# Patient Record
Sex: FEMALE | Race: BLACK OR AFRICAN AMERICAN | Employment: FULL TIME | URBAN - METROPOLITAN AREA
[De-identification: names, ages, dates, MRNs, and addresses within clinical notes are randomized per-mention and may not be internally consistent; named-entity substitution may affect disease eponyms.]

---

## 2021-11-11 ENCOUNTER — HOSPITAL ENCOUNTER (EMERGENCY)
Facility: HOSPITAL | Age: 46
Discharge: HOME | End: 2021-11-11
Payer: COMMERCIAL

## 2021-11-11 VITALS
SYSTOLIC BLOOD PRESSURE: 145 MMHG | OXYGEN SATURATION: 98 % | HEART RATE: 57 BPM | TEMPERATURE: 98 F | DIASTOLIC BLOOD PRESSURE: 77 MMHG | RESPIRATION RATE: 16 BRPM

## 2021-11-11 DIAGNOSIS — Z20.822 CLOSE EXPOSURE TO COVID-19 VIRUS: Primary | ICD-10-CM

## 2021-11-11 PROCEDURE — 99283 EMERGENCY DEPT VISIT LOW MDM: CPT

## 2021-11-11 RX ORDER — ACETAMINOPHEN 500 MG
1000 TABLET ORAL ONCE
Status: COMPLETED | OUTPATIENT
Start: 2021-11-11 | End: 2021-11-11

## 2021-11-11 NOTE — ED INITIAL ASSESSMENT (HPI)
Patient aox3 to ed via private vehicle requesting [id number] testing +headache x Tuesday +cough x today +exposure

## 2021-11-11 NOTE — ED PROVIDER NOTES
Patient Seen in: [facility] AND [facility] Emergency Department      History   Patient presents with:  Testing  Headache  Cough/URI    Stated Complaint: testing; headaches    Subjective:   HPI    28-year-old female presents the emergency department for evaluatio and atraumatic. Right Ear: External ear normal.      Left Ear: External ear normal.      Mouth/Throat:      Mouth: Mucous membranes are moist.      Pharynx: Oropharynx is clear. Eyes:      Extraocular Movements: Extraocular movements intact.       Co

## 2023-11-01 ENCOUNTER — HOSPITAL ENCOUNTER (INPATIENT)
Facility: HOSPITAL | Age: 48
LOS: 2 days | Discharge: HOME | End: 2023-11-03
Attending: EMERGENCY MEDICINE | Admitting: INTERNAL MEDICINE
Payer: COMMERCIAL

## 2023-11-01 ENCOUNTER — APPOINTMENT (OUTPATIENT)
Dept: MRI IMAGING | Facility: HOSPITAL | Age: 48
End: 2023-11-01
Attending: EMERGENCY MEDICINE
Payer: COMMERCIAL

## 2023-11-01 ENCOUNTER — APPOINTMENT (OUTPATIENT)
Dept: CT IMAGING | Facility: HOSPITAL | Age: 48
End: 2023-11-01
Payer: COMMERCIAL

## 2023-11-01 DIAGNOSIS — R20.2 NUMBNESS AND TINGLING OF RIGHT ARM: Primary | ICD-10-CM

## 2023-11-01 DIAGNOSIS — R20.0 NUMBNESS AND TINGLING OF RIGHT ARM: Primary | ICD-10-CM

## 2023-11-01 DIAGNOSIS — I10 HYPERTENSION, UNSPECIFIED TYPE: ICD-10-CM

## 2023-11-01 LAB
ANION GAP SERPL CALC-SCNC: 6 MMOL/L (ref 0–18)
ANTIBODY SCREEN: NEGATIVE
BASOPHILS # BLD AUTO: 0.02 X10(3) UL (ref 0–0.2)
BASOPHILS NFR BLD AUTO: 0.3 %
BUN BLD-MCNC: 8 MG/DL (ref 9–23)
BUN/CREAT SERPL: 10.3 (ref 10–20)
CALCIUM BLD-MCNC: 9.6 MG/DL (ref 8.7–10.4)
CHLORIDE SERPL-SCNC: 100 MMOL/L (ref 98–112)
CO2 SERPL-SCNC: 29 MMOL/L (ref 21–32)
CREAT BLD-MCNC: 0.78 MG/DL
DEPRECATED RDW RBC AUTO: 41.4 FL (ref 35.1–46.3)
EGFRCR SERPLBLD CKD-EPI 2021: 95 ML/MIN/1.73M2 (ref 60–?)
EOSINOPHIL # BLD AUTO: 0.11 X10(3) UL (ref 0–0.7)
EOSINOPHIL NFR BLD AUTO: 1.7 %
ERYTHROCYTE [DISTWIDTH] IN BLOOD BY AUTOMATED COUNT: 14.1 % (ref 11–15)
GLUCOSE BLD-MCNC: 104 MG/DL (ref 70–99)
GLUCOSE BLDC GLUCOMTR-MCNC: 103 MG/DL (ref 70–99)
GLUCOSE BLDC GLUCOMTR-MCNC: 121 MG/DL (ref 70–99)
HCT VFR BLD AUTO: 38.4 %
HGB BLD-MCNC: 12.6 G/DL
IMM GRANULOCYTES # BLD AUTO: 0.02 X10(3) UL (ref 0–1)
IMM GRANULOCYTES NFR BLD: 0.3 %
LYMPHOCYTES # BLD AUTO: 2.46 X10(3) UL (ref 1–4)
LYMPHOCYTES NFR BLD AUTO: 38.4 %
MCH RBC QN AUTO: 26.8 PG (ref 26–34)
MCHC RBC AUTO-ENTMCNC: 32.8 G/DL (ref 31–37)
MCV RBC AUTO: 81.5 FL
MONOCYTES # BLD AUTO: 0.45 X10(3) UL (ref 0.1–1)
MONOCYTES NFR BLD AUTO: 7 %
NEUTROPHILS # BLD AUTO: 3.34 X10 (3) UL (ref 1.5–7.7)
NEUTROPHILS # BLD AUTO: 3.34 X10(3) UL (ref 1.5–7.7)
NEUTROPHILS NFR BLD AUTO: 52.3 %
OSMOLALITY SERPL CALC.SUM OF ELEC: 279 MOSM/KG (ref 275–295)
PLATELET # BLD AUTO: 272 10(3)UL (ref 150–450)
POTASSIUM SERPL-SCNC: 3.7 MMOL/L (ref 3.5–5.1)
RBC # BLD AUTO: 4.71 X10(6)UL
RH BLOOD TYPE: POSITIVE
RH BLOOD TYPE: POSITIVE
SODIUM SERPL-SCNC: 135 MMOL/L (ref 136–145)
TROPONIN I SERPL HS-MCNC: 5 NG/L
WBC # BLD AUTO: 6.4 X10(3) UL (ref 4–11)

## 2023-11-01 PROCEDURE — 70450 CT HEAD/BRAIN W/O DYE: CPT | Performed by: EMERGENCY MEDICINE

## 2023-11-01 PROCEDURE — 70551 MRI BRAIN STEM W/O DYE: CPT | Performed by: EMERGENCY MEDICINE

## 2023-11-01 RX ORDER — ACETAMINOPHEN 650 MG/1
650 SUPPOSITORY RECTAL EVERY 4 HOURS PRN
Status: DISCONTINUED | OUTPATIENT
Start: 2023-11-01 | End: 2023-11-03

## 2023-11-01 RX ORDER — METFORMIN HYDROCHLORIDE EXTENDED-RELEASE TABLETS 500 MG/1
1000 TABLET, FILM COATED, EXTENDED RELEASE ORAL
COMMUNITY

## 2023-11-01 RX ORDER — ONDANSETRON 2 MG/ML
4 INJECTION INTRAMUSCULAR; INTRAVENOUS EVERY 6 HOURS PRN
Status: DISCONTINUED | OUTPATIENT
Start: 2023-11-01 | End: 2023-11-03

## 2023-11-01 RX ORDER — LABETALOL HYDROCHLORIDE 5 MG/ML
10 INJECTION, SOLUTION INTRAVENOUS ONCE
Status: COMPLETED | OUTPATIENT
Start: 2023-11-01 | End: 2023-11-01

## 2023-11-01 RX ORDER — HEPARIN SODIUM 5000 [USP'U]/ML
5000 INJECTION, SOLUTION INTRAVENOUS; SUBCUTANEOUS EVERY 12 HOURS SCHEDULED
Status: DISCONTINUED | OUTPATIENT
Start: 2023-11-02 | End: 2023-11-03

## 2023-11-01 RX ORDER — ACETAMINOPHEN 325 MG/1
650 TABLET ORAL EVERY 4 HOURS PRN
Status: DISCONTINUED | OUTPATIENT
Start: 2023-11-01 | End: 2023-11-03

## 2023-11-01 RX ORDER — HYDRALAZINE HYDROCHLORIDE 20 MG/ML
10 INJECTION INTRAMUSCULAR; INTRAVENOUS EVERY 2 HOUR PRN
Status: DISCONTINUED | OUTPATIENT
Start: 2023-11-01 | End: 2023-11-03

## 2023-11-01 RX ORDER — SODIUM CHLORIDE 9 MG/ML
INJECTION, SOLUTION INTRAVENOUS CONTINUOUS
Status: DISCONTINUED | OUTPATIENT
Start: 2023-11-01 | End: 2023-11-02

## 2023-11-01 RX ORDER — ATORVASTATIN CALCIUM 40 MG/1
40 TABLET, FILM COATED ORAL NIGHTLY
Status: DISCONTINUED | OUTPATIENT
Start: 2023-11-01 | End: 2023-11-03

## 2023-11-01 RX ORDER — ASPIRIN 81 MG/1
324 TABLET, CHEWABLE ORAL ONCE
Status: COMPLETED | OUTPATIENT
Start: 2023-11-01 | End: 2023-11-01

## 2023-11-01 RX ORDER — ASPIRIN 81 MG/1
81 TABLET, CHEWABLE ORAL DAILY
Status: DISCONTINUED | OUTPATIENT
Start: 2023-11-02 | End: 2023-11-03

## 2023-11-01 RX ORDER — LABETALOL HYDROCHLORIDE 5 MG/ML
10 INJECTION, SOLUTION INTRAVENOUS EVERY 10 MIN PRN
Status: DISCONTINUED | OUTPATIENT
Start: 2023-11-01 | End: 2023-11-03

## 2023-11-01 RX ORDER — PROCHLORPERAZINE EDISYLATE 5 MG/ML
5 INJECTION INTRAMUSCULAR; INTRAVENOUS EVERY 8 HOURS PRN
Status: DISCONTINUED | OUTPATIENT
Start: 2023-11-01 | End: 2023-11-03

## 2023-11-02 ENCOUNTER — APPOINTMENT (OUTPATIENT)
Dept: CT IMAGING | Facility: HOSPITAL | Age: 48
End: 2023-11-02
Attending: Other
Payer: COMMERCIAL

## 2023-11-02 LAB
ATRIAL RATE: 79 BPM
CHOLEST SERPL-MCNC: 189 MG/DL (ref ?–200)
EST. AVERAGE GLUCOSE BLD GHB EST-MCNC: 163 MG/DL (ref 68–126)
GLUCOSE BLDC GLUCOMTR-MCNC: 132 MG/DL (ref 70–99)
GLUCOSE BLDC GLUCOMTR-MCNC: 145 MG/DL (ref 70–99)
GLUCOSE BLDC GLUCOMTR-MCNC: 150 MG/DL (ref 70–99)
GLUCOSE BLDC GLUCOMTR-MCNC: 183 MG/DL (ref 70–99)
HBA1C MFR BLD: 7.3 % (ref ?–5.7)
HDLC SERPL-MCNC: 44 MG/DL (ref 40–59)
LDLC SERPL CALC-MCNC: 121 MG/DL (ref ?–100)
NONHDLC SERPL-MCNC: 145 MG/DL (ref ?–130)
P AXIS: 55 DEGREES
P-R INTERVAL: 188 MS
Q-T INTERVAL: 364 MS
QRS DURATION: 76 MS
QTC CALCULATION (BEZET): 417 MS
R AXIS: 22 DEGREES
T AXIS: 24 DEGREES
TRIGL SERPL-MCNC: 136 MG/DL (ref 30–149)
VENTRICULAR RATE: 79 BPM
VLDLC SERPL CALC-MCNC: 24 MG/DL (ref 0–30)

## 2023-11-02 PROCEDURE — 99223 1ST HOSP IP/OBS HIGH 75: CPT | Performed by: OTHER

## 2023-11-02 PROCEDURE — 70496 CT ANGIOGRAPHY HEAD: CPT | Performed by: OTHER

## 2023-11-02 PROCEDURE — 70498 CT ANGIOGRAPHY NECK: CPT | Performed by: OTHER

## 2023-11-02 RX ORDER — POTASSIUM CHLORIDE 20 MEQ/1
40 TABLET, EXTENDED RELEASE ORAL ONCE
Qty: 2 TABLET | Refills: 0 | Status: COMPLETED | OUTPATIENT
Start: 2023-11-02 | End: 2023-11-02

## 2023-11-02 RX ORDER — IOHEXOL 350 MG/ML
50 INJECTION, SOLUTION INTRAVENOUS
Status: COMPLETED | OUTPATIENT
Start: 2023-11-02 | End: 2023-11-02

## 2023-11-02 RX ORDER — LOSARTAN POTASSIUM 25 MG/1
25 TABLET ORAL DAILY
Status: DISCONTINUED | OUTPATIENT
Start: 2023-11-02 | End: 2023-11-03

## 2023-11-02 NOTE — CM/SW NOTE
11/02/23 0800   CM/SW Referral Data   Referral Source Physician   Reason for Referral Discharge planning   Informant Patient   Medical Hx   Does patient have an established PCP? Yes  Jorge Jones)   Patient Info   Patient's Current Mental Status at Time of Assessment Alert;Oriented   Patient's [address]   Patient lives with Spouse/Significant other   Patient Status Prior to Admission   Independent with ADLs and Mobility Yes   Discharge Needs   Anticipated D/C needs To be determined     Pt discussed during nursing rounds. Dx r/o CVA. Home w/spouse, independent and active w/o device at baseline. MDO received for HH evals. PT/OT evals needed for dc recommendation, RN is aware. Plan: TBD    / to remain available for support and/or discharge planning.      AURORA Martin    [phone]

## 2023-11-02 NOTE — PLAN OF CARE
Patient is A&Ox4, room air, SBA. No c/o of RUE numbness and tingling. Plan for q4 neuros, NIH daily and awaiting for an echo. Call light within reach and fall precautions in place. Problem: Patient Centered Care  Goal: Patient preferences are identified and integrated in the patient's plan of care  Description: Interventions:  - What would you like us to know as we care for you?  Ynes Yadav lives at home with her significant other  - Provide timely, complete, and accurate information to patient/family  - Incorporate patient and family knowledge, values, beliefs, and cultural backgrounds into the planning and delivery of care  - Encourage patient/family to participate in care and decision-making at the level they choose  - Honor patient and family perspectives and choices  Outcome: Progressing     Problem: Diabetes/Glucose Control  Goal: Glucose maintained within prescribed range  Description: INTERVENTIONS:  - Monitor Blood Glucose as ordered  - Assess for signs and symptoms of hyperglycemia and hypoglycemia  - Administer ordered medications to maintain glucose within target range  - Assess barriers to adequate nutritional intake and initiate nutrition consult as needed  - Instruct patient on self management of diabetes  Outcome: Progressing     Problem: Patient/Family Goals  Goal: Patient/Family Long Term Goal  Description: Patient's Long Term Goal: \"to maintain my health so I don't have to be hospitalized\"    Interventions:  - Take medications as prescribed at discharge  - Follow up with PCP and neurology  - Maintain a cardiac and heart healthy diet   - See additional Care Plan goals for specific interventions  Outcome: Progressing  Goal: Patient/Family Short Term Goal  Description: Patient's Short Term Goal: \"to determine why I have this numbness and tingling to my R arm\"    Interventions:   - Neurology consult  - MRI Brain  - CT Brain  - VS and neuro assessments per stroke protocol   - PT/OT/SLP  - CTA brain/carotids  - 2D echo  - See additional Care Plan goals for specific interventions  Outcome: Progressing     Problem: CARDIOVASCULAR - ADULT  Goal: Maintains optimal cardiac output and hemodynamic stability  Description: INTERVENTIONS:  - Monitor vital signs, rhythm, and trends  - Monitor for bleeding, hypotension and signs of decreased cardiac output  - Evaluate effectiveness of vasoactive medications to optimize hemodynamic stability  - Monitor arterial and/or venous puncture sites for bleeding and/or hematoma  - Assess quality of pulses, skin color and temperature  - Assess for signs of decreased coronary artery perfusion - ex. Angina  - Evaluate fluid balance, assess for edema, trend weights  Outcome: Progressing  Goal: Absence of cardiac arrhythmias or at baseline  Description: INTERVENTIONS:  - Continuous cardiac monitoring, monitor vital signs, obtain 12 lead EKG if indicated  - Evaluate effectiveness of antiarrhythmic and heart rate control medications as ordered  - Initiate emergency measures for life threatening arrhythmias  - Monitor electrolytes and administer replacement therapy as ordered  Outcome: Progressing     Problem: NEUROLOGICAL - ADULT  Goal: Achieves stable or improved neurological status  Description: INTERVENTIONS  - Assess for and report changes in neurological status  - Initiate measures to prevent increased intracranial pressure  - Maintain blood pressure and fluid volume within ordered parameters to optimize cerebral perfusion and minimize risk of hemorrhage  - Monitor temperature, glucose, and sodium.  Initiate appropriate interventions as ordered  Outcome: Progressing

## 2023-11-02 NOTE — PLAN OF CARE
Patient reports RUE paresthesia improving. Headache resolved after Tylenol administration. VS/Neuro q4hrs. Ambulatory SBA. IVF infusing. Plan for PT/OT/SLP, 2D echo, and CTA brain/carotids in AM. Discharge home once medically cleared. Problem: Patient Centered Care  Goal: Patient preferences are identified and integrated in the patient's plan of care  Description: Interventions:  - What would you like us to know as we care for you?  Celestina Canchola lives at home with her significant other  - Provide timely, complete, and accurate information to patient/family  - Incorporate patient and family knowledge, values, beliefs, and cultural backgrounds into the planning and delivery of care  - Encourage patient/family to participate in care and decision-making at the level they choose  - Honor patient and family perspectives and choices  Outcome: Progressing     Problem: Diabetes/Glucose Control  Goal: Glucose maintained within prescribed range  Description: INTERVENTIONS:  - Monitor Blood Glucose as ordered  - Assess for signs and symptoms of hyperglycemia and hypoglycemia  - Administer ordered medications to maintain glucose within target range  - Assess barriers to adequate nutritional intake and initiate nutrition consult as needed  - Instruct patient on self management of diabetes  Outcome: Progressing     Problem: Patient/Family Goals  Goal: Patient/Family Long Term Goal  Description: Patient's Long Term Goal: \"to maintain my health so I don't have to be hospitalized\"    Interventions:  - Take medications as prescribed at discharge  - Follow up with PCP and neurology  - Maintain a cardiac and heart healthy diet   - See additional Care Plan goals for specific interventions  Outcome: Progressing  Goal: Patient/Family Short Term Goal  Description: Patient's Short Term Goal: \"to determine why I have this numbness and tingling to my R arm\"    Interventions:   - Neurology consult  - MRI Brain  - CT Brain  - VS and neuro assessments per stroke protocol   - PT/OT/SLP  - CTA brain/carotids  - 2D echo  - See additional Care Plan goals for specific interventions  Outcome: Progressing     Problem: CARDIOVASCULAR - ADULT  Goal: Maintains optimal cardiac output and hemodynamic stability  Description: INTERVENTIONS:  - Monitor vital signs, rhythm, and trends  - Monitor for bleeding, hypotension and signs of decreased cardiac output  - Evaluate effectiveness of vasoactive medications to optimize hemodynamic stability  - Monitor arterial and/or venous puncture sites for bleeding and/or hematoma  - Assess quality of pulses, skin color and temperature  - Assess for signs of decreased coronary artery perfusion - ex. Angina  - Evaluate fluid balance, assess for edema, trend weights  Outcome: Progressing  Goal: Absence of cardiac arrhythmias or at baseline  Description: INTERVENTIONS:  - Continuous cardiac monitoring, monitor vital signs, obtain 12 lead EKG if indicated  - Evaluate effectiveness of antiarrhythmic and heart rate control medications as ordered  - Initiate emergency measures for life threatening arrhythmias  - Monitor electrolytes and administer replacement therapy as ordered  Outcome: Progressing     Problem: NEUROLOGICAL - ADULT  Goal: Achieves stable or improved neurological status  Description: INTERVENTIONS  - Assess for and report changes in neurological status  - Initiate measures to prevent increased intracranial pressure  - Maintain blood pressure and fluid volume within ordered parameters to optimize cerebral perfusion and minimize risk of hemorrhage  - Monitor temperature, glucose, and sodium.  Initiate appropriate interventions as ordered  Outcome: Progressing

## 2023-11-02 NOTE — ED QUICK NOTES
Orders for admission, patient is aware of plan and ready to go upstairs. Any questions, please call ED RN Machelle Parr at extension [phone].      Patient Covid vaccination status: Fully vaccinated     COVID Test Ordered in ED: None    COVID Suspicion at Admission: N/A    Running Infusions:  None    Mental Status/LOC at time of transport: ao4    Other pertinent information:   CIWA score: N/A   NIH score:  1

## 2023-11-02 NOTE — SLP NOTE
ADULT SWALLOWING EVALUATION    ASSESSMENT    ASSESSMENT/OVERALL IMPRESSION:    SLP BSSE orders received and acknowledged. A swallow evaluation warranted per stroke protocol. The pt was admitted with diagnosis of numbness and tingling in right arm. Chart reviewed and collaborated with RN, Shahram Webb. Swallowing evaluation approved and RN reports pt is tolerating current diet and medications without difficulty. Per MD noted, \"Patient  51-year-old female with no significant past medical history here with complaints of acute onset right-sided arm tingling 2 hours prior to arrival.  Patient states that she was driving when she felt the symptoms. Then she started panicking and feels like the symptoms got worse. No leg involvement. No facial asymmetry or dysarthria. No associated weakness. No known trauma. \"  Pt seen at bedside and agreeable to participate in BSSE. Pt denies any pain. Pt is alert O x 4, afebrile with clear vocal quality, tolerating room air with oxygen saturation at 98% prior to the start of po trials. Family was present at the time of testing. Pt with no hx of dysphagia at [address] [place] [address]. Pt with functional motor speech, auditory comprehension, and verbal expression to speech screening. Pt positioned upright to 90 degrees in bed Oral [facility] examination completed. Face symmetrical at rest with functional ROM/strength with labial, lingual. and buccal movements. Assessed pt with po trials of puree, soft/hard solids, and thin liquids via open cup/straw. Pt with functional oral acceptance and bilabial seal across all trials. Oral phase of swallow appears timely with adequate bolus control and propulsion. Pt with intact bite, mastication of solids, and timely A-P transit with solid bolus. No oral stasis was present post the swallow. Pharyngeal phase of the swallow appears timely with adequate hyolaryngeal elevation/excursion. No CXR completed at this time.  Oxygen status remained >98% throughout the entire evaluation. No overt clinical signs of aspiration observed with any consistency (no coughing, no throat clearing, and vocal quality remains dry). Pt denies any globus sensation post the swallow. The pt was left at bedside resting in bed with call light in reach. At this time, pt presents with a functional oropharyngeal swallow. Recommend a regular solid diet and thin liquids/may use straw with strict adherence to safe swallowing compensatory strategies. Pt reports preferred learning style of verbal education. Results and recommendations reviewed with pt, family, and RN. Provided education via verbal discussion. The pt and family acknowledged understanding of the education and was able to verbalize/demonstrate precautions independently. SLP collaborated with RN for diet orders. Diet recommendations and swallowing precautions/strategies posted on white board at bedside. FCM SCORE: 7/7       PLAN:  Speech therapy to sign off case secondary to pt tolerating least restrictive diet and demonstrating use of swallowing precautions. Pt reports speech and swallow are at baseline. Collaborated plan of care with RN. RN and pt in agreement with plan of care. RECOMMENDATIONS   Diet Recommendations - Solids: Regular  Diet Recommendations - Liquids:  Thin Liquids                        Compensatory Strategies Recommended: Small bites and sips  Aspiration Precautions: Upright position;Small bites and sips  Medication Administration Recommendations: No restrictions  Treatment Plan/Recommendations: No further inpatient SLP service warranted  Discharge Recommendations/Plan: Home    HISTORY   MEDICAL HISTORY  Reason for Referral: Stroke protocol    Problem List  Principal Problem:    Numbness and tingling of right arm  Active Problems:    Hypertension, unspecified type      Past Medical History  Past Medical History:   Diagnosis Date    Diabetes ([facility] [address]        Prior Living Situation: Home with support  Diet Prior to Admission: Regular; Thin liquids  Precautions: Aspiration    Patient/Family Goals: To eat    SWALLOWING HISTORY  Current Diet Consistency: Regular; Thin liquids  Dysphagia History: No history of dysphagia  Imaging Results:   CT stroke:  CONCLUSION:   1. No acute infarct or hemorrhage. 2. A widened perivascular space versus a chronic left subinsular infarct. This report was called immediately to Dr. Claudia Pedroza            Dictated by (CST): Abdias Brizuela MD on 11/01/2023 at 7:08 PM       Finalized by (CST): Abdias Brizuela MD on 11/01/2023 at 7:12 PM       MRI:  CONCLUSION:   1. No acute infarct or hemorrhage. 2. Few nonspecific foci of white matter signal abnormality probably represent changes of mild chronic small vessel disease. Other causes of white matter signal abnormality including demyelinating disease are felt to be less likely. Dictated by (CST): Abdias Brizuela MD on 11/01/2023 at 8:30 PM       Finalized by (CST): Abdias Brizuela MD on 11/01/2023 at 8:34 PM       SUBJECTIVE   Pt alert and O x 4. OBJECTIVE   ORAL MOTOR EXAMINATION  Dentition: Natural;Functional  Symmetry: Within Functional Limits  Strength: Within Functional Limits  Tone: Within Functional Limits  Range of Motion: Within Functional Limits  Rate of Motion: Within Functional Limits    Voice Quality: Clear  Respiratory Status: Unlabored  Consistencies Trialed: Thin liquids;Puree;Hard solid  Method of Presentation: Self presentation;Spoon;Cup;Straw;Single sips; Consecutive swallows  Patient Positioning: Upright;Midline    Oral Phase of Swallow: Within Functional Limits                      Pharyngeal Phase of Swallow: Within Functional Limits           (Please note: Silent aspiration cannot be evaluated clinically.  Videofluoroscopic Swallow Study is required to rule-out silent aspiration.)    Esophageal Phase of Swallow: No complaints consistent with possible esophageal involvement                FOLLOW UP  Treatment Plan/Recommendations: No further inpatient SLP service warranted     Follow Up Needed (Documentation Required): No       Thank you for your referral.   If you have any questions, please contact     Abigail Bueno MS/[facility]-SLP  Speech Language Pathologist  [address] [facility]  [address].  [address]

## 2023-11-02 NOTE — PHYSICAL THERAPY NOTE
PHYSICAL THERAPY EVALUATION - INPATIENT     Room Number: 807/055-Q  Evaluation Date: 11/2/2023  Type of Evaluation: Initial   Physician Order: PT Eval and Treat    Presenting Problem: numbness and tingling right arm  Reason for Therapy: Mobility Dysfunction and Discharge Planning    PHYSICAL THERAPY ASSESSMENT   Orders received and chart reviewed. TYRONE Henley approved participation in physical therapy. Session coordinated with OT, gait belt donned. PPE worn by therapist: mask and gloves. Patient was not wearing a mask during session. Patient is a 41year old female admitted 11/1/2023 for Presenting Problem: numbness and tingling right arm. Patient presented in bed with 3/10 pain. Education provided on Physical therapy plan of care and physiological benefits of out of bed mobility. Patient with good carryover. Patient on room air. Patient currently independent in mobility throughout the session with no assistive device. Patient with elevated blood pressure 175/92, oxygen sat 95% and heart rate 84. Stairs deferred due to blood pressure, patient with no concerns for home, independent in all mobility during the session. Patient demonstrates independence with functional mobility skills as noted below. Patient verbalizes no further mobility concerns at this time, is functioning safely with mobility to D/C at this level of care. Updated nurse on results of session, patient left in bed with family present, all lines intact, all needs met with call light in reach. Based on the physical therapy examination of the noted systems and mobility skills, the patient presentation is stable given the patient demonstrates no significant barriers to meeting therapy goals. Physical Therapy to sign off at this time, please re-consult if patient experiences a decline in functional status.      Bed mobility: Independent  Transfers: Independent  Gait Assistance: Independent  Distance (ft): 100ft  Assistive Device: None             Patient was left in bed at end of session with all needs in reach. Patient does have the physical skills to return to prior living environment upon D/C from the hospital. Anticipate patient will not benefit from continued skilled physical therapy while in the hospital and can be discharged. The patient's Approx Degree of Impairment: 0% has been calculated based on documentation in the [facility] '6 clicks' Inpatient Basic Mobility Short Form. Research supports that patients with this level of impairment may benefit from Home with no services. RN aware of patient status post session. DISCHARGE RECOMMENDATIONS  PT Discharge Recommendations: Home    PHYSICAL THERAPY MEDICAL/SOCIAL HISTORY   Problem List  Principal Problem:    Numbness and tingling of right arm  Active Problems:    Hypertension, unspecified type    Past Medical History  Past Medical History:   Diagnosis Date    Diabetes ([address]      Past Surgical History  Past Surgical History:   Procedure Laterality Date    HERNIA SURGERY      inguinal hernias x 2, umbilical x 1    REPAIR ING HERNIA,5+Y/O,REDUCIBL       HOME SITUATION  Type of Home: House   Home Layout: One level  Stairs to Enter : 5  Railing: Yes  Stairs to Bedroom: 12 (to basement)  Railing: Yes  Lives With: Significant other  Drives: Yes  Patient Owned Equipment: None  Patient Regularly Uses: None    Prior Level of [place]: Patient reports being independent in ADL's and ambulation with no assistive device prior to admission to the hospital.     SUBJECTIVE  \"I have this shoulder and neck pain\"    PHYSICAL THERAPY EXAMINATION     OBJECTIVE  Precautions: Aspiration  Fall Risk: Standard fall risk    WEIGHT BEARING RESTRICTION  Weight Bearing Restriction: None                PAIN ASSESSMENT  Rating: 3  Location: left shoulder and neck  Management Techniques: Activity promotion; Body mechanics;Repositioning    COGNITION  Overall Cognitive Status:  WFL - within functional limits    RANGE OF MOTION AND STRENGTH ASSESSMENT    Lower extremity ROM is within functional limits  BLE WNL    Lower extremity strength is within functional limits  BLE WNL    BALANCE  Static Sitting: Good  Dynamic Sitting: Good  Static Standing: Good  Dynamic Standing: Good    AM-PAC '6-Clicks' INPATIENT SHORT FORM - BASIC MOBILITY  How much difficulty does the patient currently have. .. Patient Difficulty: Turning over in bed (including adjusting bedclothes, sheets and blankets)?: None   Patient Difficulty: Sitting down on and standing up from a chair with arms (e.g., wheelchair, bedside commode, etc.): None   Patient Difficulty: Moving from lying on back to sitting on the side of the bed?: None   How much help from another person does the patient currently need. .. Help from Another: Moving to and from a bed to a chair (including a wheelchair)?: None   Help from Another: Need to walk in hospital room?: None   Help from Another: Climbing 3-5 steps with a railing?: None     AM-PAC Score:  Raw Score: 24   Approx Degree of Impairment: 0%   Standardized Score (AM-PAC Scale): 61.14   CMS Modifier (G-Code): [facility]    Exercise/Education Provided:  Bed mobility  Body mechanics  Gait training  Transfer training    Patient End of Session: In bed;Needs met;Call light within reach;RN aware of session/findings; All patient questions and concerns addressed; Family present     Patient Evaluation Complexity Level:  History Low - no personal factors and/or co-morbidities   Examination of body systems Low - addressing 1-2 elements   Clinical Presentation Low - Stable   Clinical Decision Making Low Complexity     Gait Training: 15 minutes

## 2023-11-03 ENCOUNTER — APPOINTMENT (OUTPATIENT)
Dept: CV DIAGNOSTICS | Facility: HOSPITAL | Age: 48
End: 2023-11-03
Attending: Other
Payer: COMMERCIAL

## 2023-11-03 VITALS
BODY MASS INDEX: 38.61 KG/M2 | DIASTOLIC BLOOD PRESSURE: 85 MMHG | HEART RATE: 76 BPM | OXYGEN SATURATION: 97 % | RESPIRATION RATE: 18 BRPM | WEIGHT: 204.5 LBS | SYSTOLIC BLOOD PRESSURE: 139 MMHG | TEMPERATURE: 99 F | HEIGHT: 61 IN

## 2023-11-03 LAB
GLUCOSE BLDC GLUCOMTR-MCNC: 115 MG/DL (ref 70–99)
GLUCOSE BLDC GLUCOMTR-MCNC: 133 MG/DL (ref 70–99)
POTASSIUM SERPL-SCNC: 4.1 MMOL/L (ref 3.5–5.1)

## 2023-11-03 PROCEDURE — 99231 SBSQ HOSP IP/OBS SF/LOW 25: CPT | Performed by: OTHER

## 2023-11-03 PROCEDURE — 93306 TTE W/DOPPLER COMPLETE: CPT | Performed by: OTHER

## 2023-11-03 RX ORDER — ATORVASTATIN CALCIUM 40 MG/1
40 TABLET, FILM COATED ORAL NIGHTLY
Qty: 30 TABLET | Refills: 0 | Status: SHIPPED | OUTPATIENT
Start: 2023-11-03

## 2023-11-03 RX ORDER — ASPIRIN 81 MG/1
81 TABLET, CHEWABLE ORAL DAILY
Qty: 30 TABLET | Refills: 0 | Status: SHIPPED | OUTPATIENT
Start: 2023-11-04

## 2023-11-03 RX ORDER — LOSARTAN POTASSIUM 25 MG/1
25 TABLET ORAL DAILY
Qty: 30 TABLET | Refills: 0 | Status: SHIPPED | OUTPATIENT
Start: 2023-11-04

## 2023-11-03 NOTE — PLAN OF CARE
Bed locked in low position, belongings in reach, call light in reach. Frequent rounding done, all patient needs met. Non-slip socks on/at bedside. All testing negative for CVA, neuro checks DC'd. Up ad bel, saline locked. Echo still needed. Will DC home when medically cleared. Problem: Patient Centered Care  Goal: Patient preferences are identified and integrated in the patient's plan of care  Description: Interventions:  - What would you like us to know as we care for you?  Mariana Doan lives at home with her significant other  - Provide timely, complete, and accurate information to patient/family  - Incorporate patient and family knowledge, values, beliefs, and cultural backgrounds into the planning and delivery of care  - Encourage patient/family to participate in care and decision-making at the level they choose  - Honor patient and family perspectives and choices  Outcome: Progressing     Problem: Diabetes/Glucose Control  Goal: Glucose maintained within prescribed range  Description: INTERVENTIONS:  - Monitor Blood Glucose as ordered  - Assess for signs and symptoms of hyperglycemia and hypoglycemia  - Administer ordered medications to maintain glucose within target range  - Assess barriers to adequate nutritional intake and initiate nutrition consult as needed  - Instruct patient on self management of diabetes  Outcome: Progressing     Problem: Patient/Family Goals  Goal: Patient/Family Long Term Goal  Description: Patient's Long Term Goal: \"to maintain my health so I don't have to be hospitalized\"    Interventions:  - Take medications as prescribed at discharge  - Follow up with PCP and neurology  - Maintain a cardiac and heart healthy diet   - See additional Care Plan goals for specific interventions  Outcome: Progressing  Goal: Patient/Family Short Term Goal  Description: Patient's Short Term Goal: \"to determine why I have this numbness and tingling to my R arm\"    Interventions:   - Neurology consult  - MRI Brain  - CT Brain  - VS and neuro assessments per stroke protocol   - PT/OT/SLP  - CTA brain/carotids  - 2D echo  - See additional Care Plan goals for specific interventions  Outcome: Progressing     Problem: CARDIOVASCULAR - ADULT  Goal: Maintains optimal cardiac output and hemodynamic stability  Description: INTERVENTIONS:  - Monitor vital signs, rhythm, and trends  - Monitor for bleeding, hypotension and signs of decreased cardiac output  - Evaluate effectiveness of vasoactive medications to optimize hemodynamic stability  - Monitor arterial and/or venous puncture sites for bleeding and/or hematoma  - Assess quality of pulses, skin color and temperature  - Assess for signs of decreased coronary artery perfusion - ex. Angina  - Evaluate fluid balance, assess for edema, trend weights  Outcome: Progressing  Goal: Absence of cardiac arrhythmias or at baseline  Description: INTERVENTIONS:  - Continuous cardiac monitoring, monitor vital signs, obtain 12 lead EKG if indicated  - Evaluate effectiveness of antiarrhythmic and heart rate control medications as ordered  - Initiate emergency measures for life threatening arrhythmias  - Monitor electrolytes and administer replacement therapy as ordered  Outcome: Progressing     Problem: NEUROLOGICAL - ADULT  Goal: Achieves stable or improved neurological status  Description: INTERVENTIONS  - Assess for and report changes in neurological status  - Initiate measures to prevent increased intracranial pressure  - Maintain blood pressure and fluid volume within ordered parameters to optimize cerebral perfusion and minimize risk of hemorrhage  - Monitor temperature, glucose, and sodium.  Initiate appropriate interventions as ordered  Outcome: Progressing

## 2023-11-03 NOTE — PLAN OF CARE
Patient personal belongings and call light within reach. Safety measures in place. No needs at the time. Patient is waiting on discharge awaiting final orders. Discharge planning and education to be completed. Problem: Patient Centered Care  Goal: Patient preferences are identified and integrated in the patient's plan of care  Description: Interventions:  - What would you like us to know as we care for you?  Brent Romo lives at home with her significant other  - Provide timely, complete, and accurate information to patient/family  - Incorporate patient and family knowledge, values, beliefs, and cultural backgrounds into the planning and delivery of care  - Encourage patient/family to participate in care and decision-making at the level they choose  - Honor patient and family perspectives and choices  Outcome: Progressing     Problem: Diabetes/Glucose Control  Goal: Glucose maintained within prescribed range  Description: INTERVENTIONS:  - Monitor Blood Glucose as ordered  - Assess for signs and symptoms of hyperglycemia and hypoglycemia  - Administer ordered medications to maintain glucose within target range  - Assess barriers to adequate nutritional intake and initiate nutrition consult as needed  - Instruct patient on self management of diabetes  Outcome: Progressing     Problem: Patient/Family Goals  Goal: Patient/Family Long Term Goal  Description: Patient's Long Term Goal: \"to maintain my health so I don't have to be hospitalized\"    Interventions:  - Take medications as prescribed at discharge  - Follow up with PCP and neurology  - Maintain a cardiac and heart healthy diet   - See additional Care Plan goals for specific interventions  Outcome: Progressing     Problem: CARDIOVASCULAR - ADULT  Goal: Maintains optimal cardiac output and hemodynamic stability  Description: INTERVENTIONS:  - Monitor vital signs, rhythm, and trends  - Monitor for bleeding, hypotension and signs of decreased cardiac output  - Evaluate effectiveness of vasoactive medications to optimize hemodynamic stability  - Monitor arterial and/or venous puncture sites for bleeding and/or hematoma  - Assess quality of pulses, skin color and temperature  - Assess for signs of decreased coronary artery perfusion - ex. Angina  - Evaluate fluid balance, assess for edema, trend weights  Outcome: Progressing     Problem: NEUROLOGICAL - ADULT  Goal: Achieves stable or improved neurological status  Description: INTERVENTIONS  - Assess for and report changes in neurological status  - Initiate measures to prevent increased intracranial pressure  - Maintain blood pressure and fluid volume within ordered parameters to optimize cerebral perfusion and minimize risk of hemorrhage  - Monitor temperature, glucose, and sodium.  Initiate appropriate interventions as ordered  Outcome: Progressing

## 2023-11-03 NOTE — PLAN OF CARE
Patient was provided with discharge instructions, education, and follow up information. Printed prescriptions were given to patient and prescriptions were already sent electronically to patient's pharmacy. Patient verbalizes understanding of follow up information, specifically Neurology. Patient has no questions after reviewing all instructions and will be going home.      Boone HANSEN, Discharge Leader [id number]

## 2023-11-03 NOTE — CM/SW NOTE
11/03/23 1600   Discharge disposition   Expected discharge disposition Home or Self   Discharge transportation Private car     Pt discussed during nursing rounds. Pt is stable for dc today. MD dc order entered. PT/OT recommending home w/o services, so no home care needs identified on dc. Pt's spouse will provide transport at dc. Plan: Home w/spouse today. / to remain available for support and/or discharge planning.      AURORA Lopez    [phone]

## 2023-11-03 NOTE — PLAN OF CARE
Ms. Shea Hammond is A&O x 4. Lives at home wit family. Independent assist with ADLs. Continent of bowel and bladder. VSS:  stable. Denies pain. Plan:  discharge home s/p ECHO results  Will continue to monitor and treat. Problem: Patient Centered Care  Goal: Patient preferences are identified and integrated in the patient's plan of care  Description: Interventions:  - What would you like us to know as we care for you?  Paresh Kim lives at home with her significant other  - Provide timely, complete, and accurate information to patient/family  - Incorporate patient and family knowledge, values, beliefs, and cultural backgrounds into the planning and delivery of care  - Encourage patient/family to participate in care and decision-making at the level they choose  - Honor patient and family perspectives and choices  Outcome: Completed     Problem: Diabetes/Glucose Control  Goal: Glucose maintained within prescribed range  Description: INTERVENTIONS:  - Monitor Blood Glucose as ordered  - Assess for signs and symptoms of hyperglycemia and hypoglycemia  - Administer ordered medications to maintain glucose within target range  - Assess barriers to adequate nutritional intake and initiate nutrition consult as needed  - Instruct patient on self management of diabetes  Outcome: Completed     Problem: Patient/Family Goals  Goal: Patient/Family Long Term Goal  Description: Patient's Long Term Goal: \"to maintain my health so I don't have to be hospitalized\"    Interventions:  - Take medications as prescribed at discharge  - Follow up with PCP and neurology  - Maintain a cardiac and heart healthy diet   - See additional Care Plan goals for specific interventions  Outcome: Completed  Goal: Patient/Family Short Term Goal  Description: Patient's Short Term Goal: \"to determine why I have this numbness and tingling to my R arm\"    Interventions:   - Neurology consult  - MRI Brain  - CT Brain  - VS and neuro assessments per stroke protocol   - PT/OT/SLP  - CTA brain/carotids  - 2D echo  - See additional Care Plan goals for specific interventions  Outcome: Completed     Problem: CARDIOVASCULAR - ADULT  Goal: Maintains optimal cardiac output and hemodynamic stability  Description: INTERVENTIONS:  - Monitor vital signs, rhythm, and trends  - Monitor for bleeding, hypotension and signs of decreased cardiac output  - Evaluate effectiveness of vasoactive medications to optimize hemodynamic stability  - Monitor arterial and/or venous puncture sites for bleeding and/or hematoma  - Assess quality of pulses, skin color and temperature  - Assess for signs of decreased coronary artery perfusion - ex. Angina  - Evaluate fluid balance, assess for edema, trend weights  Outcome: Completed  Goal: Absence of cardiac arrhythmias or at baseline  Description: INTERVENTIONS:  - Continuous cardiac monitoring, monitor vital signs, obtain 12 lead EKG if indicated  - Evaluate effectiveness of antiarrhythmic and heart rate control medications as ordered  - Initiate emergency measures for life threatening arrhythmias  - Monitor electrolytes and administer replacement therapy as ordered  Outcome: Completed     Problem: NEUROLOGICAL - ADULT  Goal: Achieves stable or improved neurological status  Description: INTERVENTIONS  - Assess for and report changes in neurological status  - Initiate measures to prevent increased intracranial pressure  - Maintain blood pressure and fluid volume within ordered parameters to optimize cerebral perfusion and minimize risk of hemorrhage  - Monitor temperature, glucose, and sodium.  Initiate appropriate interventions as ordered  Outcome: Completed

## 2023-11-06 NOTE — PAYOR COMM NOTE
--------------  DISCHARGE REVIEW    Payor: [employer] #:  [id number]  Authorization Number: [id number]    Admit date: 11/1/23  Admit time:  11:33 PM  Discharge Date: 11/3/2023  5:30 PM     Admitting Physician: Sal Garcia MD  Attending Physician:  Farhana att. providers found  Primary Care Physician: Charmain Eisenmenger, MD

## 2023-12-22 ENCOUNTER — OFFICE VISIT (OUTPATIENT)
Dept: NEUROLOGY | Facility: CLINIC | Age: 48
End: 2023-12-22
Payer: COMMERCIAL

## 2023-12-22 DIAGNOSIS — R20.0 NUMBNESS AND TINGLING OF RIGHT ARM: Primary | ICD-10-CM

## 2023-12-22 DIAGNOSIS — R20.2 NUMBNESS AND TINGLING OF RIGHT ARM: Primary | ICD-10-CM

## 2023-12-22 PROCEDURE — 99213 OFFICE O/P EST LOW 20 MIN: CPT | Performed by: OTHER

## 2023-12-22 RX ORDER — LOSARTAN POTASSIUM 100 MG/1
100 TABLET ORAL DAILY
COMMUNITY
Start: 2023-12-20

## 2023-12-22 RX ORDER — ASPIRIN 81 MG/1
81 TABLET, CHEWABLE ORAL DAILY
Qty: 90 TABLET | Refills: 3 | Status: SHIPPED | OUTPATIENT
Start: 2023-12-22

## 2023-12-22 RX ORDER — CARVEDILOL 3.12 MG/1
TABLET ORAL 2 TIMES DAILY WITH MEALS
COMMUNITY
Start: 2023-11-21

## 2023-12-22 RX ORDER — AMLODIPINE BESYLATE 5 MG/1
5 TABLET ORAL DAILY
COMMUNITY
Start: 2023-11-20

## 2023-12-22 RX ORDER — ATORVASTATIN CALCIUM 40 MG/1
40 TABLET, FILM COATED ORAL NIGHTLY
Qty: 90 TABLET | Refills: 3 | Status: SHIPPED | OUTPATIENT
Start: 2023-12-22

## 2023-12-22 NOTE — PROGRESS NOTES
Neurology Follow up Visit     Referred By: Dr. Delcid ref. provider found    Chief Complaint:   Chief Complaint   Patient presents with    Numbness     Pt presents today for hospital f/u numbness/tingling to right arm on 11/1. There was question of TIA. Symptoms resolved by the following day. No further episodes since hospitalization       HPI:     Shanique Field is a 57year old female, who presents for follow-up hospitalization in November 2023. Patient with history of diabetes, but no other medical history, no prior history of neurological symptoms. While driving home she started developing numbness in the right hand and arm, possibly in the right leg as well. Nothing in the face. By the time she arrived to the emergency room, she continued to have some minimal numbness in the hand. MRI of the brain was done and it was not revealing. CTA of the head also was not revealing. By the next day patient was feeling much better and essentially back to usual self. LDL was 121    Possibility of TIA was entertained and therefore since aspirin was started, atorvastatin was started. Patient    Follow-up in December 2023. Doing well. Continued with aspirin but forgot order and out of atorvastatin. Sultana Saab Past Medical History:   Diagnosis Date    Diabetes [facility])        Past Surgical History:   Procedure Laterality Date    HERNIA SURGERY      inguinal hernias x 2, umbilical x 1    REPAIR ING HERNIA,5+Y/O,REDUCIBL         Social history:  History   Smoking Status    Never   Smokeless Tobacco    Never     History   Alcohol Use    Yes     Comment: social drinking     History   Drug Use Unknown       Family History   Problem Relation Age of Onset    COPD Father     Hypertension Mother     Diabetes Mother     Hypertension Sister          Current Outpatient Medications:     losartan 100 MG Oral Tab, Take 1 tablet (100 mg total) by mouth daily. , Disp: , Rfl:     carvedilol 3.125 MG Oral Tab, Take by mouth 2 (two) times daily with meals. , Disp: , Rfl:     amLODIPine 5 MG Oral Tab, Take 1 tablet (5 mg total) by mouth daily. , Disp: , Rfl:     Empagliflozin (JARDIANCE OR), Take by mouth daily. , Disp: , Rfl:     aspirin 81 MG Oral Chew Tab, Chew 1 tablet (81 mg total) by mouth daily. , Disp: 30 tablet, Rfl: 0    metFORMIN HCl ER, OSM, 500 MG (OSM) Oral Tablet 24 Hr, Take 2 tablets (1,000 mg total) by mouth daily with breakfast., Disp: , Rfl:     atorvastatin 40 MG Oral Tab, Take 1 tablet (40 mg total) by mouth nightly. (Patient not taking: Reported on 12/22/2023), Disp: 30 tablet, Rfl: 0    Allergies   Allergen Reactions    Amoxicillin RASH       ROS:   As in HPI, the rest of the 14 system review was done and was negative      Physical Exam:  Vitals:    12/22/23 0912   BP: (P) 120/87   Pulse: (P) 91   SpO2: (P) 100%       General: No apparent distress, well nourished, well groomed. Head- Normocephalic, atraumatic  Eyes- No redness or swelling  ENT- Hearing intake, normal glutition  Neck- No masses or adenopathy  Cv: pulses were palpable and normal, no cyanosis or edema     Neurological:     Mental Status- Alert and oriented x3. Normal attention span and concentration  Thought process intact  Memory intact- recent and remote  Mood intact  Fund of knowledge appropriate for education and age    Language intact including: comprehension, naming, repetition, vocabulary    Cranial Nerves:    VII. Face symmetric, no facial weakness  VIII. Hearing intact to whisper. IX. Pallet elevates symmetrically. XI. Shoulder shrug is intact  XII.  Tongue is midline    Motor Exam:  Muscle tone normal  No atrophy or fasciculations  Strength- upper extremities 5/5 proximally and distally                    Rapid alternating movements intact    Gait:  Normal posture  Normal physiologic      Labs:    No results found for: \"TSH\"  Lab Results   Component Value Date    HDL 44 11/02/2023     (H) 11/02/2023    TRIG 136 11/02/2023     Lab Results   Component Value Date    HGB 12.6 11/01/2023    HCT 38.4 11/01/2023    MCV 81.5 11/01/2023    WBC 6.4 11/01/2023    .0 11/01/2023      Lab Results   Component Value Date    BUN 8 (L) 11/01/2023    CA 9.6 11/01/2023     (L) 11/01/2023    K 4.1 11/03/2023     11/01/2023    CO2 29.0 11/01/2023      I have reviewed labs. Imaging Studies:  I have independently reviewed imaging. MRI of the brain was independent reviewed, not revealing      Assessment   1. Numbness and tingling of right arm  Possibility of TIA could be entertained. No history of migraines to suggest any other etiology. Therefore continue with aspirin, statin, blood pressure and diabetes control. STROKE RISK FACTORS:   *Hypertension - Target blood pressure <140/90, <130/85 for high risk, normal 120/80  *Physical inactivity - target exercise at least 3 times per week  *Obesity - target ideal body weight and girth <35\" for women, <40\" for men  *Diabetes - Target <6.5-7%   *Smoking - Target smoking cessation  *Hyperlipidemia - Target total cholesterol < 200, Target LDL <100, < 70 for high risk, Target HDL >45 for men, >55 for women, Target triglycerides <150               Education and counseling provided to patient. Instructed patient to call my office or seek medical attention immediately if symptoms worsen. Patient verbalized understanding of information given. All questions were answered. All side effects of drugs were discussed. Return to clinic in: No follow-ups on file.     Jo Carreno MD

## 2024-11-15 RX ORDER — ATORVASTATIN CALCIUM 40 MG/1
40 TABLET, FILM COATED ORAL NIGHTLY
Qty: 90 TABLET | Refills: 1 | Status: SHIPPED | OUTPATIENT
Start: 2024-11-15

## 2024-11-15 NOTE — TELEPHONE ENCOUNTER
Pt requesting a refill of atorvastatin 40mg tablets.    Last office visit: 12/22/23    Next office visit: None scheduled.     Assessment  1. Numbness and tingling of right arm  Possibility of TIA could be entertained.  No history of migraines to suggest any other etiology.  Therefore continue with aspirin, statin, blood pressure and diabetes control.     STROKE RISK FACTORS:   *Hypertension - Target blood pressure <140/90, <130/85 for high risk, normal 120/80  *Physical inactivity - target exercise at least 3 times per week  *Obesity - target ideal body weight and girth <35\" for women, <40\" for men  *Diabetes - Target <6.5-7%   *Smoking - Target smoking cessation  *Hyperlipidemia - Target total cholesterol < 200, Target LDL <100, < 70 for high risk, Target HDL >45 for men, >55 for women, Target triglycerides <150                       Education and counseling provided to patient. Instructed patient to call my office or seek medical attention immediately if symptoms worsen.  Patient verbalized understanding of information given. All questions were answered. All side effects of drugs were discussed.         Return to clinic in: No follow-ups on file.     Tai Patel MD

## 2024-12-03 RX ORDER — ASPIRIN 81 MG
81 TABLET,CHEWABLE ORAL DAILY
Qty: 90 TABLET | Refills: 3 | Status: SHIPPED | OUTPATIENT
Start: 2024-12-03

## 2024-12-03 NOTE — TELEPHONE ENCOUNTER
Pt requesting a refill of aspirin 81 MG Oral Chew Tab   Last office visit: 12/22/23   Due back to clinic per last office note:  Return if symptoms worsen or fail to improve.   Next office visit: None scheduled.     Outpatient Medication Detail     Disp Refills Start End    aspirin 81 MG Oral Chew Tab 90 tablet 3 12/22/2023 --    Sig - Route: Chew 1 tablet (81 mg total) by mouth daily. - Oral    Sent to pharmacy as: Aspirin 81 MG Oral Tablet Chewable    E-Prescribing Status: Receipt confirmed by pharmacy (12/22/2023  9:28 AM CST)      Pharmacy    [id number] - [address] [phone], [phone]        Last OV note recommendation:     Assessment  1. Numbness and tingling of right arm  Possibility of TIA could be entertained.  No history of migraines to suggest any other etiology.  Therefore continue with aspirin, statin, blood pressure and diabetes control.     STROKE RISK FACTORS:   *Hypertension - Target blood pressure <140/90, <130/85 for high risk, normal 120/80  *Physical inactivity - target exercise at least 3 times per week  *Obesity - target ideal body weight and girth <35\" for women, <40\" for men  *Diabetes - Target <6.5-7%   *Smoking - Target smoking cessation  *Hyperlipidemia - Target total cholesterol < 200, Target LDL <100, < 70 for high risk, Target HDL >45 for men, >55 for women, Target triglycerides <150    Return if symptoms worsen or fail to improve.

## 2025-03-05 RX ORDER — ATORVASTATIN CALCIUM 40 MG/1
40 TABLET, FILM COATED ORAL NIGHTLY
Qty: 90 TABLET | Refills: 1 | Status: SHIPPED | OUTPATIENT
Start: 2025-03-05

## 2025-03-05 NOTE — TELEPHONE ENCOUNTER
Refill Request    Medication request: ATORVASTATIN 40 MG Oral Tab TAKE 1 TABLET BY MOUTH EVERY DAY AT NIGHT     LOV: 12/22/23  Due back to clinic per last office note:  \"Return if symptoms worsen or fail to improve. \"  NOV: Visit date not found      [place] 2/15/25 #90 - 90 day supply    LOV plan (if weaning or changing medications): Per  at last office visit: \"Follow-up in December 2023. Doing well. Continued with aspirin but forgot order and out of atorvastatin. \"